# Patient Record
Sex: FEMALE | Race: WHITE | NOT HISPANIC OR LATINO | Employment: FULL TIME | URBAN - METROPOLITAN AREA
[De-identification: names, ages, dates, MRNs, and addresses within clinical notes are randomized per-mention and may not be internally consistent; named-entity substitution may affect disease eponyms.]

---

## 2017-02-01 ENCOUNTER — OFFICE VISIT (OUTPATIENT)
Dept: PSYCHOLOGY | Facility: CLINIC | Age: 42
End: 2017-02-01

## 2017-02-01 DIAGNOSIS — F41.1 GENERALIZED ANXIETY DISORDER: Primary | ICD-10-CM
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
## 2017-09-03 ENCOUNTER — HEALTH MAINTENANCE LETTER (OUTPATIENT)
Age: 42
End: 2017-09-03

## 2019-06-04 ENCOUNTER — HOSPITAL ENCOUNTER (OUTPATIENT)
Dept: CT IMAGING | Facility: HOSPITAL | Age: 44
Discharge: HOME | End: 2019-06-04

## 2019-06-04 ENCOUNTER — RECORDS - HEALTHEAST (OUTPATIENT)
Dept: ADMINISTRATIVE | Facility: OTHER | Age: 44
End: 2019-06-04

## 2019-06-04 ENCOUNTER — OFFICE VISIT (OUTPATIENT)
Dept: FAMILY MEDICINE | Facility: CLINIC | Age: 44
End: 2019-06-04
Payer: COMMERCIAL

## 2019-06-04 ENCOUNTER — COMMUNICATION - HEALTHEAST (OUTPATIENT)
Dept: TELEHEALTH | Facility: CLINIC | Age: 44
End: 2019-06-04

## 2019-06-04 VITALS
DIASTOLIC BLOOD PRESSURE: 68 MMHG | HEIGHT: 63 IN | OXYGEN SATURATION: 100 % | WEIGHT: 106.2 LBS | RESPIRATION RATE: 16 BRPM | BODY MASS INDEX: 18.82 KG/M2 | HEART RATE: 70 BPM | TEMPERATURE: 97.6 F | SYSTOLIC BLOOD PRESSURE: 118 MMHG

## 2019-06-04 DIAGNOSIS — J35.1 TONSILLAR HYPERTROPHY: ICD-10-CM

## 2019-06-04 DIAGNOSIS — K13.79 DEVIATION OF UVULA TO LEFT: Primary | ICD-10-CM

## 2019-06-04 DIAGNOSIS — K13.79 DEVIATION OF UVULA TO LEFT: ICD-10-CM

## 2019-06-04 DIAGNOSIS — J02.9 SORETHROAT: ICD-10-CM

## 2019-06-04 ASSESSMENT — PATIENT HEALTH QUESTIONNAIRE - PHQ9: SUM OF ALL RESPONSES TO PHQ QUESTIONS 1-9: 0

## 2019-06-04 ASSESSMENT — MIFFLIN-ST. JEOR: SCORE: 1103.85

## 2019-06-04 NOTE — LETTER
RETURN TO WORK/SCHOOL FORM    6/4/2019    Re: Kamilla Duarte  1975      To Whom It May Concern:     Kamilla Duarte was seen in clinic today..  She may return to work without restrictions on 6/5/19. Patient also has another appointment with specialist this afternoon. Thank you       Restrictions:        Massimo Vega MD  6/4/2019 9:34 AM

## 2019-06-04 NOTE — PROGRESS NOTES
Preceptor Attestation:   Patient seen, evaluated and discussed with the resident. I have verified the content of the note, which accurately reflects my assessment of the patient and the plan of care.  Supervising Physician:Leonela Reyna MD  [facility]

## 2019-06-04 NOTE — PATIENT INSTRUCTIONS
CT SCAN APPT: TODAY 6/4/19 AT 12:20 PM AT [facility] RADIOLOGY. CHECK IN AT 12:05 PM. NOTHING TO EAT OR DRINK 2 HOURS PRIOR TO APPT.   Referral Faxed

## 2019-06-04 NOTE — PROGRESS NOTES
HPI:       Kamilla Duarte is a 44 year old  female with a significant past medical history of restless leg syndrome, depression and anxiety who presents for the new concern(s) of    Sore throat  - Started about 1 week ago and has gotten better today  - This morning, notice more soreness on right side, describing it as a deep sensation  - Lost her voice, but improving today  - Swallow causes pain but no trouble with breathing  - Had cold; rhinorrhea, coughing,  - Sick contact with co-worker having similar symptoms  - No fevers, lightheadedness, dizziness, or significant weight changes  - Has been using cough drops and Teraflu with some relief         PMHX:     Patient Active Problem List   Diagnosis     Health Care Home     Restless legs syndrome (RLS)     Ovarian cyst     Generalized anxiety disorder     Carpal tunnel syndrome     Depression     Underweight       Current Outpatient Medications   Medication Sig Dispense Refill     PARoxetine (PAXIL) 20 MG tablet Take 1 tablet (20 mg) by mouth At Bedtime (Patient not taking: Reported on 6/4/2019) 30 tablet 1     PARoxetine (PAXIL) 40 MG tablet Take 1 tablet (40 mg) by mouth At Bedtime (Patient not taking: Reported on 6/4/2019) 30 tablet 11       Social History     Socioeconomic History     Marital status: Single     Spouse name: Not on file     Number of children: Not on file     Years of education: Not on file     Highest education level: Not on file   Occupational History     Not on file   Social Needs     Financial resource strain: Not on file     Food insecurity:     Worry: Not on file     Inability: Not on file     Transportation needs:     Medical: Not on file     Non-medical: Not on file   Tobacco Use     Smoking status: Never Smoker     Smokeless tobacco: Never Used   Substance and Sexual Activity     Alcohol use: No     Alcohol/week: 0.0 oz     Drug use: No     Sexual activity: Not on file   Lifestyle     Physical activity:     Days per week: Not on file      Minutes per session: Not on file     Stress: Not on file   Relationships     Social connections:     Talks on phone: Not on file     Gets together: Not on file     Attends Christianity service: Not on file     Active member of club or organization: Not on file     Attends meetings of clubs or organizations: Not on file     Relationship status: Not on file     Intimate partner violence:     Fear of current or ex partner: Not on file     Emotionally abused: Not on file     Physically abused: Not on file     Forced sexual activity: Not on file   Other Topics Concern     Parent/sibling w/ CABG, MI or angioplasty before 65F 55M? Not Asked   Social History Narrative    Older son: living with biologic father and not patient (poor behavior driving move)    David - 6 year old boy (concerns for ADHD)    Addy - 3-4 year old girl taking part in [employer]    J Luis: partner and father of Brandon     Family History   Problem Relation Age of Onset     Diabetes No family hx of      Coronary Artery Disease No family hx of      Hypertension No family hx of      Breast Cancer No family hx of      Colon Cancer No family hx of      Prostate Cancer No family hx of      Other Cancer No family hx of      Osteoporosis No family hx of      Allergies   Allergen Reactions     Nkda [No Known Drug Allergies]        No results found for this or any previous visit (from the past 24 hour(s)).         Review of Systems:   C: NEGATIVE for fatigue, unexpected change in weight  E: NEGATIVE for acute vision problems or changes  ENT/MOUTH: See HPI  RESP: See HPI  CV: NEGATIVE for chest pain, palpitations or new or worsening peripheral edema  GI: NEGATIVE for new onset or worsening nausea, vomiting or diarrhea  : NEGATIVE for frequency, dysuria, or hematuria  M: NEGATIVE for claudication, myalgias  N: NEGATIVE for weakness, dizziness, syncope, headaches  P: NEGATIVE for changes in mood or affect          Physical Exam:     Vitals:    06/04/19 0859    Resp: 16     There is no height or weight on file to calculate BMI.    GENERAL APPEARANCE: healthy, alert and no distress,  EYES: Eyes grossly normal to inspection,  PERRL  HENT: ear canals and TM's normal, right tonsil bed slightly enlarged with mild erythema with uvula deviated to the left  NECK: no adenopathy, no asymmetry, masses, or scars and thyroid normal to palpation  RESP: lungs clear to auscultation - no rales, rhonchi or wheezes  CV: regular rate and rhythm,  and no murmur, click,  rub or gallop  MS: extremities normal- no gross deformities noted  NEURO: Grossly normal  PSYCH: mood and affect normal/bright    Assessment and Plan     1. Deviation of uvula to left  2. Sorethroat  Concerned for peritonsillar abscess given uvula deviation and sore throat. Less likely strep throat without fever and with mild coughing. Possibility related to recent viral URI given history of sick contact and symptoms. Discuss with patient concerns and plans for evaluation with neck imaging which patient was agree able to.  - CT Neck today w/ contrast  - No antibiotics at this time  - Follow up with imaging, if signs of peritonsillar abscess will need evaluation with ENT  - If not, treat supportive and reevaluation in 1 week if not improving    Options for treatment and follow-up care were reviewed with the patient and/or guardian. Kamilla Duarte and/or guardian engaged in the decision making process and verbalized understanding of the options discussed and agreed with the final plan.    Massimo Vega MD  [facility]  Pager: [phone]    Precepted today with: Leonela Reyna MD

## 2019-06-05 ENCOUNTER — TELEPHONE (OUTPATIENT)
Dept: FAMILY MEDICINE | Facility: CLINIC | Age: 44
End: 2019-06-05

## 2019-06-05 NOTE — TELEPHONE ENCOUNTER
Called patient to discuss results.     She states she is now having some difficulty breathing and unable to drink water.     Given that this is more change from notes yesterday and this AM with breathing concerns recommend she go to the ED for evaluation. I did not feel comfortable prescribing her Abx or steroids without evaluation.     Patient plans to go to [facility]. Will call ED and inform of referral.     Prateek Moses MD   [facility]  Pager #: [phone]

## 2019-06-05 NOTE — TELEPHONE ENCOUNTER
Report available via HE and is as follows:  Significant Findings:  Indeterminate 11.7x 11.6mm focus of enhancement in the right palatine tonsil. Both infection and neoplasm are in the differential. This lesion demonstrates relative hypoenhancement centrally.  2.No lymphadenopathy    Will route to Dr.Meyers Leonela, for review and plan for patient r/t symptomatic.  Veronica ROBERT

## 2019-06-05 NOTE — TELEPHONE ENCOUNTER
Called [facility] r/t no report yet. More answered the phone and stated report needs to be corrected. Advised has to be routed to covering physician and will not be able to after 5pm. More stated will have it done before then to ensure a physician can be notified in time. Veronica ROBERT

## 2019-06-05 NOTE — TELEPHONE ENCOUNTER
Patient and her boyfriend walked into clinic to obtain results from imaging performed yesterday at [facility]. Results are not yet available in chart or  Epic system. C/o sore throat has worsened, not able to swallow well. Offered an afternoon appt for patient in hopes by then results would be available and with worsening symptoms, physician could re-evaluated throat. Pt declined, will await for results and further recommendation for treatment. In the meantime, recommend she can take adult dose of Children's ibuprofen liquid suspension to help with throat soreness.     Would like to request for work note to also be excused from work today as well. Yesterday received work note and was to return back to work today, 6/5/2019. Due to throat soreness worsening, not able to go back to work. To be excused off work, will have to route to Dr Vega for further intervention and action. Thank you. Yuliya ROBERT

## 2019-06-05 NOTE — TELEPHONE ENCOUNTER
Significant other states that patient can't really talk and is in pain. States you can call his number to let them know what they should do. Did notify him that the doctor might not address things if he can't get a verbal from the patient that it is okay to speak to him about her.

## 2019-06-05 NOTE — TELEPHONE ENCOUNTER
Discussed with Pamela regarding abnormal CT result. Radiologist wanted to ensure  reads radiology report based on abnormal findings. Noted focus enhancement on CT indicating infection or neoplasm. Will await report and route to house officer as  is not in clinic until tomorrow afternoon and patient is symptomatic. Veronica ROBERT

## 2019-06-06 ENCOUNTER — TELEPHONE (OUTPATIENT)
Dept: FAMILY MEDICINE | Facility: CLINIC | Age: 44
End: 2019-06-06

## 2019-06-07 ENCOUNTER — OFFICE VISIT (OUTPATIENT)
Dept: FAMILY MEDICINE | Facility: CLINIC | Age: 44
End: 2019-06-07
Payer: COMMERCIAL

## 2019-06-07 VITALS
DIASTOLIC BLOOD PRESSURE: 80 MMHG | RESPIRATION RATE: 14 BRPM | BODY MASS INDEX: 18.07 KG/M2 | WEIGHT: 102 LBS | SYSTOLIC BLOOD PRESSURE: 134 MMHG | HEART RATE: 82 BPM | HEIGHT: 63 IN | TEMPERATURE: 98.3 F | OXYGEN SATURATION: 99 %

## 2019-06-07 DIAGNOSIS — R09.A2 GLOBUS SENSATION: ICD-10-CM

## 2019-06-07 DIAGNOSIS — J36 PERITONSILLAR ABSCESS: Primary | ICD-10-CM

## 2019-06-07 RX ORDER — IBUPROFEN 100 MG/5ML
SUSPENSION, ORAL (FINAL DOSE FORM) ORAL
Refills: 0 | COMMUNITY
Start: 2019-06-05 | End: 2019-07-26

## 2019-06-07 RX ORDER — OXYCODONE HYDROCHLORIDE 5 MG/1
TABLET ORAL
Refills: 0 | COMMUNITY
Start: 2019-06-05 | End: 2019-07-26

## 2019-06-07 RX ORDER — AMOXICILLIN AND CLAVULANATE POTASSIUM 400; 57 MG/5ML; MG/5ML
875 POWDER, FOR SUSPENSION ORAL
COMMUNITY
Start: 2019-06-05 | End: 2019-07-11

## 2019-06-07 ASSESSMENT — MIFFLIN-ST. JEOR: SCORE: 1081.8

## 2019-06-07 NOTE — PATIENT INSTRUCTIONS
- Follow up in clinic with Dr. Salazar    [address] [place] [address], [id number]  [place] [address]

## 2019-06-07 NOTE — PROGRESS NOTES
HPI:       Kamilla Duarte is a 44  year old  female with a significant past medical history of restless leg syndrome, depression and anxiety who presents for    Peritonsillar Abscess  -Was seen in clinic approximately 3 days ago for sore throat and enlarged tonsil on exam  -Was sent for an ultrasound and presented to [facility] emergency department 1 day later for worsening throat pain  -CT neck shows 1 x 1 cm right peritonsillar mass concerning for infectious versus neoplasm process  ENT was consulted in the emergency department and recommended Augmentin with close follow-up in clinic  -Was scheduled for ENT clinic in the next week but symptoms are starting to get worse  -At today's clinic visit, she noted a globus feeling in the back of her throat describing it as a dry ball, no difficulty breathing or swallowing but has been hesitant due to pain  -Denied any fever, chills, lightheadedness, dizziness  -Has been compliant with liquid ibuprofen, antiacid and antibiotics  -Does not feel like she is going to be up to make it until clinic         PMHX:     Patient Active Problem List   Diagnosis     Health Care Home     Restless legs syndrome (RLS)     Ovarian cyst     Generalized anxiety disorder     Carpal tunnel syndrome     Depression     Underweight     Current Outpatient Medications   Medication Sig Dispense Refill     amoxicillin-clavulanate (AUGMENTIN) 400-57 MG/5ML suspension Take 875 mg by mouth       ibuprofen (ADVIL/MOTRIN) 100 MG/5ML suspension TK 20ML PO Q 6 H PRN P OR FEVER  0     oxyCODONE (ROXICODONE) 5 MG tablet   0     Social History     Socioeconomic History     Marital status: Single     Spouse name: Not on file     Number of children: Not on file     Years of education: Not on file     Highest education level: Not on file   Occupational History     Not on file   Social Needs     Financial resource strain: Not on file     Food insecurity:     Worry: Not on file     Inability: Not on file      Transportation needs:     Medical: Not on file     Non-medical: Not on file   Tobacco Use     Smoking status: Never Smoker     Smokeless tobacco: Never Used   Substance and Sexual Activity     Alcohol use: No     Alcohol/week: 0.0 oz     Drug use: No     Sexual activity: Not on file   Lifestyle     Physical activity:     Days per week: Not on file     Minutes per session: Not on file     Stress: Not on file   Relationships     Social connections:     Talks on phone: Not on file     Gets together: Not on file     Attends Buddhist service: Not on file     Active member of club or organization: Not on file     Attends meetings of clubs or organizations: Not on file     Relationship status: Not on file     Intimate partner violence:     Fear of current or ex partner: Not on file     Emotionally abused: Not on file     Physically abused: Not on file     Forced sexual activity: Not on file   Other Topics Concern     Parent/sibling w/ CABG, MI or angioplasty before 65F 55M? Not Asked   Social History Narrative    Older son: living with biologic father and not patient (poor behavior driving move)    David - 6 year old boy (concerns for ADHD)    Addy - 3-4 year old girl taking part in [employer]    J Luis: partner and father of Brandon       Family History   Problem Relation Age of Onset     Diabetes No family hx of      Coronary Artery Disease No family hx of      Hypertension No family hx of      Breast Cancer No family hx of      Colon Cancer No family hx of      Prostate Cancer No family hx of      Other Cancer No family hx of      Osteoporosis No family hx of           Allergies   Allergen Reactions     Nkda [No Known Drug Allergies]        No results found for this or any previous visit (from the past 24 hour(s)).         Review of Systems:   C: NEGATIVE for fatigue, unexpected change in weight  E: NEGATIVE for acute vision problems or changes  ENT/MOUTH: See HPI  R: NEGATIVE for significant cough or shortness of  "breath  CV: NEGATIVE for chest pain, palpitations or new or worsening peripheral edema  GI: NEGATIVE for new onset or worsening nausea, vomiting or diarrhea  M: NEGATIVE for claudication, myalgias  N: NEGATIVE for weakness, dizziness, syncope, headaches  P: NEGATIVE for changes in mood or affect          Physical Exam:     Vitals:    06/07/19 1452   BP: 134/80   Pulse: 82   Resp: 14   Temp: 98.3  F (36.8  C)   TempSrc: Oral   SpO2: 99%   Weight: 46.3 kg (102 lb)   Height: 1.6 m (5' 3\")     Body mass index is 18.07 kg/m .    GENERAL APPEARANCE: healthy, alert and no distress,  HENT: Right tonsillar bed enlargement with no exudate, deviated uvula to the left, unable to open mouth beyond 1 inch, pain is localized to the preauricular region  NECK: no adenopathy, no asymmetry, masses, or scars and thyroid normal to palpation  RESP: lungs clear to auscultation - no rales, rhonchi or wheezes  CV: regular rate and rhythm,  and no murmur, click,  rub or gallop  NEURO: Grossly nromal  PSYCH: anxious and worried      Assessment and Plan     1. Peritonsillar abscess  2. Globus Sensation  Recently underwent CT neck showing 1 x 1 cm mass concerning for infectious versus neoplasm.  Has been started on Augmentin for the last 2 days which she has been compliant with.  Has been taking ibuprofen with no significant relief.  Has a upcoming appointment with ENT but will be seen until 1 week.  Unable to eat due to pain and experiencing globus feeling.  -Discussed imaging finding with patient  -Continue with antibiotics and liquid ibuprofen  -Emergent ENT evaluation, will proceed straight to [facility] for possible I&D    Options for treatment and follow-up care were reviewed with the patient and/or guardian. Kamilla Duarte and/or guardian engaged in the decision making process and verbalized understanding of the options discussed and agreed with the final plan.    Massimo Vega MD  [facility]  Pager: [phone]    Precepted today with: Dirk Dillard MD

## 2019-06-10 ENCOUNTER — TELEPHONE (OUTPATIENT)
Dept: FAMILY MEDICINE | Facility: CLINIC | Age: 44
End: 2019-06-10

## 2019-06-11 ENCOUNTER — TELEPHONE (OUTPATIENT)
Dept: FAMILY MEDICINE | Facility: CLINIC | Age: 44
End: 2019-06-11

## 2019-06-11 NOTE — TELEPHONE ENCOUNTER
Date of discharge: 06/09/2019  Facility of discharge: [facility]  Patient concerns about condition: No concerns at this time.  Patient concerns about medications: No concerns at this time.  Full med reconciliation will be completed at clinic visit.  Patient concerns about transitioning: No concerns at this time.  Clinic office visit appointment date: 06/13/2019  Patient reminded to bring all medications (prescription and over-the-counter) to clinic appointment: Yes    Using the date of discharge as day 1, the 30th day post discharge is 07/09/2019.

## 2019-06-11 NOTE — PROGRESS NOTES
I have personally reviewed the history and examination as documented by Dr. Vega.  I was present during key portions of the visit and agree with the assessment and plan as documented for 44 yr old female with peritonsillar abscess here for follow-up. Symptoms worsening. Acute appt made w/ ENT for drainage in clinic. Precautions given. Anticipatory guidance given.     Dirk Dillard MD  June 11, 2019  2:40 PM

## 2019-06-13 ENCOUNTER — OFFICE VISIT (OUTPATIENT)
Dept: FAMILY MEDICINE | Facility: CLINIC | Age: 44
End: 2019-06-13
Payer: COMMERCIAL

## 2019-06-13 VITALS
SYSTOLIC BLOOD PRESSURE: 113 MMHG | DIASTOLIC BLOOD PRESSURE: 77 MMHG | HEART RATE: 79 BPM | WEIGHT: 101.6 LBS | HEIGHT: 63 IN | BODY MASS INDEX: 18 KG/M2 | RESPIRATION RATE: 18 BRPM | TEMPERATURE: 97.3 F | OXYGEN SATURATION: 99 %

## 2019-06-13 DIAGNOSIS — Z09 HOSPITAL DISCHARGE FOLLOW-UP: Primary | ICD-10-CM

## 2019-06-13 PROBLEM — J36 PERITONSILLAR ABSCESS: Status: ACTIVE | Noted: 2019-06-08

## 2019-06-13 ASSESSMENT — MIFFLIN-ST. JEOR: SCORE: 1079.98

## 2019-06-13 NOTE — LETTER
RETURN TO WORK/SCHOOL FORM    6/13/2019    Re: Kamilla Duarte  1975      To Whom It May Concern:     Kamilla Duarte was seen in clinic today..  She may return to work without restrictions on 6/17/19          Restrictions:  None      Aron Bean MD  6/13/2019 10:55 AM

## 2019-06-13 NOTE — PROGRESS NOTES
I have personally reviewed the history and examination as documented by Dr. Bean.  I was present during key portions of the visit and agree with the assessment and plan as documented for 44 yr old female with recent hospitalization for peritonsillar abscess here for follow-up.  Stable/ improved. Precautions given. Anticipatory guidance given.     Dirk Dillard MD  June 13, 2019  11:31 AM

## 2019-06-13 NOTE — PATIENT INSTRUCTIONS
Continue to eat food as tolerated.  Cut up foods into small bite-sized pieces.  You can drink supplement shakes (ex. Boost) up to three times per day.    Try not to over exert yourself.  If you feel tired with an activity, you can stop it.  However, try to stay active as tolerated.    You should continue to feel more energy over the next several day.    Please follow up with ENT at your scheduled visit.  We will see you again in 6-8 weeks

## 2019-06-13 NOTE — PROGRESS NOTES
"  Hospitalization Follow-up Visit         [facility]       Hospital Follow-up Visit:    Hospital:  [facility]   Date of Admission: 6/7/19  Date of Discharge: 6/9/19  Reason(s) for Admission: Peritonsillar abscess            Problems taking medications regularly:  None       Post Discharge Medication Reconciliation: discharge medications reconciled, continue medications without change.       Problems adhering to non-medication therapy:  None       Medications reviewed by: by myself.    Summary of hospitalization:  [facility] discharge summary reviewed  Diagnostic Tests/Treatments reviewed.  Follow up needed: none  Other Healthcare Providers Involved in Patient s Care: ENT visit this 6/14/19  Update since discharge: improved.  Plan of care communicated with patient      Interval Hx: meals consist of noodles, soft foods, cut up vegetables.  Doesn't feel swollen up in her throat.  Still taking amoxicillin and no issues with it.  Her ability to open her mouth is 50% normal now.  ROS: (+)Still muffled, fatigued, weight loss.  (-)dizziness, sore throat, drooling, cough, trouble breathing, fever/chills, headache, trouble turning head, neck pain, chest pain, sob, nausea, vomiting         Review of Systems:   12-point ROS negative except as noted in HPI            Physical Exam:     Vitals:    06/13/19 1019   BP: 113/77   Pulse: 79   Resp: 18   Temp: 97.3  F (36.3  C)   TempSrc: Oral   SpO2: 99%   Weight: 46.1 kg (101 lb 9.6 oz)   Height: 1.6 m (5' 3\")     Body mass index is 18 kg/m .    GENERAL: healthy, alert and no distress  EYES: PERRL, conjunctivae and sclerae normal and lids and lashes normal  HENT: ear canals and TM's normal, nose and mouth without ulcers or lesions, posterior pharynx edematous, no tonsillar edema or asymmetry, uvula midline  NECK: thyroid normal to palpation, trachea midline and normal to palpation and scattered cervical adenopathy 1-cm  RESP: lungs clear to auscultation - no rales, no rhonchi, no " wheezes  CV: regular rates and rhythm, normal S1 S2, no S3 or S4 and no murmur, no click or rub  ABDOMEN: soft, no tenderness, no  hepatosplenomegaly, no masses, normal bowel sounds  MS: extremities- no gross deformities noted, no edema  SKIN: no suspicious lesions, no rashes  NEURO: Normal strength and tone, sensory exam grossly normal  BACK: no CVA tenderness, no paralumbar tenderness  PSYCH: Alert and oriented times 3; speech- coherent , normal rate and volume; able to articulate logical thoughts, able to abstract reason, no tangential thoughts, no hallucinations or delusions, affect- normal         Results:     Results from last visit   Results for orders placed or performed in visit on 11/27/15   Chlamydia/Gono Amplified ([facility])   Result Value Ref Range    Chlamydia trac,Amplified Prb Negative Negative    N gonorrhoeae,Amplified Prb Negative Negative    Narrative    Test performed by:  [facility] [address] [place] [address]       Assessment and Plan      Kamilla was seen today for hospital f/u and medication reconciliation.    Diagnoses and all orders for this visit:    Hospital discharge follow-up    4-days post-hospital discharge for peritonsillar abscess.  Patient received IV clindamycin and IV dexamethasone.  She quickly improved with these treatments.  She was switched to oral Augmentin.  Since discharge, she continues to improve symptomatically.  She still deals with fatigue and poor appetite.  -Provided time-off work note  -Provided advice in regards to activity, diet, and expectation for energy level recovery  -Suggested three supplement shakes per day on top of meals  -F/u 6-8 weeks     E&M code to be billed if TCM cannot be: 93584    Type of decision making: Moderate complexity (00246)    Options for treatment and follow-up care were reviewed with the patient  Kamillahéctor Duarte   engaged in the decision making process and verbalized understanding of the options discussed and  agreed with the final plan.      Aron Bean MD

## 2019-06-14 ENCOUNTER — OFFICE VISIT - HEALTHEAST (OUTPATIENT)
Dept: OTOLARYNGOLOGY | Facility: CLINIC | Age: 44
End: 2019-06-14

## 2019-06-14 ENCOUNTER — TRANSFERRED RECORDS (OUTPATIENT)
Dept: HEALTH INFORMATION MANAGEMENT | Facility: CLINIC | Age: 44
End: 2019-06-14

## 2019-06-14 DIAGNOSIS — J36 PERITONSILLAR ABSCESS: ICD-10-CM

## 2019-07-10 ENCOUNTER — TELEPHONE (OUTPATIENT)
Dept: FAMILY MEDICINE | Facility: CLINIC | Age: 44
End: 2019-07-10

## 2019-07-11 ENCOUNTER — OFFICE VISIT (OUTPATIENT)
Dept: FAMILY MEDICINE | Facility: CLINIC | Age: 44
End: 2019-07-11
Payer: COMMERCIAL

## 2019-07-11 VITALS
WEIGHT: 102 LBS | OXYGEN SATURATION: 98 % | SYSTOLIC BLOOD PRESSURE: 158 MMHG | BODY MASS INDEX: 18.07 KG/M2 | RESPIRATION RATE: 20 BRPM | HEIGHT: 63 IN | HEART RATE: 72 BPM | DIASTOLIC BLOOD PRESSURE: 81 MMHG | TEMPERATURE: 97.6 F

## 2019-07-11 DIAGNOSIS — N91.2 AMENORRHEA: Primary | ICD-10-CM

## 2019-07-11 LAB
FSH: 28.2 MIU/ML
PROLACTIN SERPL-MCNC: 6.5 NG/ML (ref 0–20)
TSH SERPL DL<=0.05 MIU/L-ACNC: 1.21 UIU/ML (ref 0.3–5)

## 2019-07-11 ASSESSMENT — MIFFLIN-ST. JEOR: SCORE: 1081.8

## 2019-07-11 NOTE — LETTER
July 17, 2019      Kamilla Duarte  [address] [place] [address]        Dear Kamilla,    Please see below for your test results.    Thank you for visiting our clinic on Jul 11, 2019.     The result of your recent labwork has returned and is as follows:     Your TSH and prolactin hormone levels were normal.   Your FSH level was slightly elevated and your estradiol level was low which can be seen in several conditions that can cause your periods to stop.     Dr. Vega will be able to further review these lab results and discuss next steps at your appointment on Friday July 26 at 1:20 PM.     If you have any questions or concerns, please feel free to give us a call.     Resulted Orders   Prolactin ([facility])   Result Value Ref Range    Prolactin 6.5 0.0 - 20.0 ng/mL    Narrative    Test performed by:  [facility] [address] [place] [address]   TSH  Sensitive ([facility])   Result Value Ref Range    TSH 1.21 0.30 - 5.00 uIU/mL    Narrative    Test performed by:  [facility] [address] [place] [address]   FSH ([facility])   Result Value Ref Range    FSH 28.2 mIU/mL      Comment:         Females:     Prepubertal     0-10 mIU/mL    Follicular      3-20 mIU/mL    Luteal          0-12 mIU/mL    Ovulatory       9-26 mIU/mL    Postmenopausal   mIU/mL      Narrative    Test performed by:  [facility] [address] [place] [address]   Estradiol ([facility])   Result Value Ref Range    Estradiol 10 pg/mL    Narrative    Test performed by:  [facility] [address] [place] [address]  Males:  Prepubertal.................<12 pg/mL  Adult........................10-60 pg/mL  Females:  Prepubertal.................<8 pg/mL  Early Follicular............ pg/mL  Late Follicular.............100-400 pg/mL  Luteal...................... pg/mL  Postmenopausal..............<18 pg/mL       If you have any questions, please call the clinic to make an  appointment.    Sincerely,    Gretchen Us MD

## 2019-07-11 NOTE — PROGRESS NOTES
"       HPI:       Kamilla Duarte is a 44 year old female who presents for the new concern of:    1. Amenorrhea:   Has been at least 3 months, maybe 4 months since her last period. Previously had 30 day cycles. Took several home pregnancy tests which were negative, went to the ED on 7/9/19 where serum pregnancy test was negative. Did have approximately 1 hour of vaginal spotting at that time. She is very concerned about pregnancy and wants an ultrasound today \"just to be sure.\" States that her best friend had a negative pregnancy test and ended up delivering a baby 6 months ago and so patient is concerned that all of her previous tests may have been false negatives.    She has had 3 prior pregnancies that resulted in live births (children ages 8, 18, and 22). She is sexually active with her boyfriend and they do not use any type of birth control. She states that she has had to increase her pant size over the past month, however chart review shows no weight change. No breast tenderness, morning sickness, or other signs of pregnancy.    She is not sure when her mother or other female family members went through menopause. No hot flashes or night sweats. No history of endocrine disorders, denies hair or nail changes, temperature intolerance, or anxiety.     Wt Readings from Last 5 Encounters:   07/11/19 46.3 kg (102 lb)   06/13/19 46.1 kg (101 lb 9.6 oz)   06/07/19 46.3 kg (102 lb)   06/04/19 48.2 kg (106 lb 3.2 oz)   11/27/15 42.5 kg (93 lb 12.8 oz)            PMHX:     Patient Active Problem List   Diagnosis     Health Care Home     Restless legs syndrome (RLS)     Ovarian cyst     Generalized anxiety disorder     Carpal tunnel syndrome     Depression     Underweight     Peritonsillar abscess     Current Outpatient Medications   Medication Sig Dispense Refill     ibuprofen (ADVIL/MOTRIN) 100 MG/5ML suspension TK 20ML PO Q 6 H PRN P OR FEVER  0     oxyCODONE (ROXICODONE) 5 MG tablet   0     Social History     Social " "History Narrative    Lives with her partner, J Luis, and their daughter Addy who is 8. She has 18 and 22 year old sons. Denies tobacco, alcohol, marijuana, or illicit drug use.        Allergies   Allergen Reactions     Nkda [No Known Drug Allergies]      Results for orders placed or performed in visit on 07/11/19 (from the past 24 hour(s))   Prolactin ([facility])   Result Value Ref Range    Prolactin 6.5 0.0 - 20.0 ng/mL    Narrative    Test performed by:  [facility] [address] [place] [address]   TSH  Sensitive ([facility])   Result Value Ref Range    TSH 1.21 0.30 - 5.00 uIU/mL    Narrative    Test performed by:  [facility] [address] [place] [address]   FSH ([facility])   Result Value Ref Range    FSH 28.2 mIU/mL    Narrative    Test performed by:  [facility] [address] [place] [address]          Review of Systems:     10 point ROS neg other than the symptoms noted above in the HPI.          Physical Exam:     Vitals:    07/11/19 1620 07/11/19 1630   BP: 145/81 158/81   Pulse: 72    Resp: 20    Temp: 97.6  F (36.4  C)    SpO2: 98%    Weight: 46.3 kg (102 lb)    Height: 1.6 m (5' 3\")      Body mass index is 18.07 kg/m .    GENERAL APPEARANCE: healthy, alert and no distress,  EYES: Eyes grossly normal to inspection,  PERRL  NECK: no adenopathy, no asymmetry, masses, or scars and thyroid normal to palpation  RESP: lungs clear to auscultation - no rales, rhonchi or wheezes  CV: regular rate and rhythm,  and no murmur, click,  rub or gallop  ABDOMEN: soft, nontender, without hepatosplenomegaly or masses  MS: extremities normal- no gross deformities noted  SKIN: no suspicious lesions or rashes, no evidence of hirsuitism on exam  NEURO: Normal strength and tone, sensory exam grossly normal, mentation appears intact and speech normal      Assessment and Plan     1. Amenorrhea  Patient with 3-4 months of amenorrhea. Pregnancy has been ruled out with multiple urine and " blood tests, no evidence of uterine enlargement on physical exam today, so I do not believe and ultrasound is warranted at this time. Possible etiologies for her secondary amenorrhea include premature menopause, hypothalamic dysfunction, pituitary dysfunction. No evidence of hirsuitism on exam that would be concerning for hyperandrogenism as the cause of her amenorrhea. Patient's TSH and prolactin levels were within normal limits. Her FSH level was normal - will add on E2 level to evaluate for hypogonadotropic hypogonadism. Should that be normal, would consider performing a progesterone withdrawal test.   - Prolactin ([facility])  - TSH  Sensitive ([facility])  - FSH ([facility])  - Estradiol ([facility])    Options for treatment and follow-up care were reviewed with the patient and/or guardian. Kamilla Duarte and/or guardian engaged in the decision making process and verbalized understanding of the options discussed and agreed with the final plan.      Gretchen Us MD  [facility] Resident  P: [phone]    Precepted today with: Airam Xiao MD

## 2019-07-11 NOTE — PATIENT INSTRUCTIONS
The blood test that they did at the ED on 7/9/19 was negative for pregnancy. Let's check some hormone levels to evaluate for other causes of irregular periods.

## 2019-07-12 LAB — ESTRADIOL SERPL-MCNC: 10 PG/ML

## 2019-07-15 NOTE — PROGRESS NOTES
Preceptor Attestation:  Patient's case reviewed and discussed with Gretchen Us MD resident and I evaluated the patient. I agree with written assessment and plan of care.  Supervising Physician:  MARCI HEMPHILL MD  [facility]

## 2019-07-22 ENCOUNTER — TELEPHONE (OUTPATIENT)
Dept: FAMILY MEDICINE | Facility: CLINIC | Age: 44
End: 2019-07-22

## 2019-07-22 NOTE — TELEPHONE ENCOUNTER
"Called patient and advised of annotated results. Patient stated \"does that mean I am in menopause\". Advised patient not necessarily r/t FSH is typically a level that is consistently elevated in menopause, stated this could mean further evaluation or possible diagnosis. Advised to come to upcoming appointment and results and next steps can be discussed with the provider. Patient stated she has been very emotional lately and had a breakdown at work. Reinforced importance of coming to upcoming appointment, provided day and time for patient. Patient verbalized understanding. Veronica ROBERT  "

## 2019-07-22 NOTE — TELEPHONE ENCOUNTER
[facility] phone call message- patient requesting results:    Test: Lab    Date of test: 07/11/2019    Additional Comments: Patient states she was in clinic and had some testing done. Patient has not heard back and would like to follow up on her results, Please call and advise.    OK to leave a message on voice mail? Yes    Primary language: English      needed? No    Call taken on July 22, 2019 at 9:51 AM by Eder Danielle

## 2019-07-26 ENCOUNTER — OFFICE VISIT (OUTPATIENT)
Dept: FAMILY MEDICINE | Facility: CLINIC | Age: 44
End: 2019-07-26
Payer: COMMERCIAL

## 2019-07-26 VITALS
DIASTOLIC BLOOD PRESSURE: 80 MMHG | WEIGHT: 102 LBS | BODY MASS INDEX: 18.07 KG/M2 | HEIGHT: 63 IN | SYSTOLIC BLOOD PRESSURE: 134 MMHG | RESPIRATION RATE: 14 BRPM | TEMPERATURE: 98 F | OXYGEN SATURATION: 100 % | HEART RATE: 65 BPM

## 2019-07-26 DIAGNOSIS — N91.2 AMENORRHEA: Primary | ICD-10-CM

## 2019-07-26 DIAGNOSIS — F43.9 STRESS: ICD-10-CM

## 2019-07-26 ASSESSMENT — MIFFLIN-ST. JEOR: SCORE: 1081.8

## 2019-07-26 NOTE — PROGRESS NOTES
Preceptor Attestation:   Patient seen, evaluated and discussed with the resident. I have verified the content of the note, which accurately reflects my assessment of the patient and the plan of care.    Supervising Physician:Antione Tijerina MD    [facility]

## 2019-07-26 NOTE — PROGRESS NOTES
HPI:       Kamilla Duarte is a 44 year old  female with a significant past medical history of anxiety, depression, restless leg syndrome who presents for follow up of concern(s) listed below    Amenorrhea  - LMP was about 3 months ago and unclear if she had been regular prior to that  - Unclear when she first had her period  - Does not know when her family enter menopause   - Has been pregnant 3 times and is concerned that she may pregnant again despite knowing she had negative UPT  - Recent blood work with FSH, Estradiol, TSH and Prolactin. Was told that her TSH and Prolactin was normal but her Estradiol was lower and FSH slightly high  - No urinary or bowel movement changes, lightheadedness or dizziness, chest pain, shortness of breath, vision, hearing changes, increase in hair growth or recent weight changes  - Has notice more stress at work    Stress  - Has been more stressful at work with co-worker worrying about her  - Concerned about her son and how the county is taking care of him  - Concerned about daughter when at a Protest and was concerned about guns  - Denied any manic symptoms including grandiosity, ability to function without sleep or prolong periods of not sleeping, impulsive behavior such as spending spree  - No homicidal or suicidal ideations         PMHX:     Patient Active Problem List   Diagnosis     Health Care Home     Restless legs syndrome (RLS)     Ovarian cyst     Generalized anxiety disorder     Carpal tunnel syndrome     Depression     Underweight     Peritonsillar abscess     No current outpatient medications on file.       Social History     Socioeconomic History     Marital status: Single     Spouse name: Not on file     Number of children: Not on file     Years of education: Not on file     Highest education level: Not on file   Occupational History     Not on file   Social Needs     Financial resource strain: Not on file     Food insecurity:     Worry: Not on file      Inability: Not on file     Transportation needs:     Medical: Not on file     Non-medical: Not on file   Tobacco Use     Smoking status: Never Smoker     Smokeless tobacco: Never Used   Substance and Sexual Activity     Alcohol use: No     Alcohol/week: 0.0 oz     Drug use: No     Sexual activity: Not on file   Lifestyle     Physical activity:     Days per week: Not on file     Minutes per session: Not on file     Stress: Not on file   Relationships     Social connections:     Talks on phone: Not on file     Gets together: Not on file     Attends Che[employer] service: Not on file     Active member of club or organization: Not on file     Attends meetings of clubs or organizations: Not on file     Relationship status: Not on file     Intimate partner violence:     Fear of current or ex partner: Not on file     Emotionally abused: Not on file     Physically abused: Not on file     Forced sexual activity: Not on file   Other Topics Concern     Parent/sibling w/ CABG, MI or angioplasty before 65F 55M? Not Asked   Social History Narrative    Lives with her partner, J Luis, and their daughter Addy who is 8. She has 18 and 22 year old sons. Denies tobacco, alcohol, marijuana, or illicit drug use.        Family History   Problem Relation Age of Onset     Diabetes No family hx of      Coronary Artery Disease No family hx of      Hypertension No family hx of      Breast Cancer No family hx of      Colon Cancer No family hx of      Prostate Cancer No family hx of      Other Cancer No family hx of      Osteoporosis No family hx of           Allergies   Allergen Reactions     Nkda [No Known Drug Allergies]        No results found for this or any previous visit (from the past 24 hour(s)).         Review of Systems:   C: NEGATIVE for fatigue, unexpected change in weight  E: NEGATIVE for acute vision problems or changes  R: NEGATIVE for significant cough or shortness of breath  CV: NEGATIVE for chest pain, palpitations or new or  "worsening peripheral edema  GI: NEGATIVE for new onset or worsening nausea, vomiting or diarrhea  : NEGATIVE for frequency, dysuria, or hematuria  M: NEGATIVE for claudication, myalgias  N: NEGATIVE for weakness, dizziness, syncope, headaches  E: NEGATIVE for temperature intolerance, skin/hair changes  H: NEGATIVE for easy bruising or bleeding problems  P: NEGATIVE for changes in mood or affect          Physical Exam:     Vitals:    07/26/19 1314   BP: 134/80   Pulse: 65   Resp: 14   Temp: 98  F (36.7  C)   TempSrc: Oral   SpO2: 100%   Weight: 46.3 kg (102 lb)   Height: 1.6 m (5' 3\")     Body mass index is 18.07 kg/m .    GENERAL APPEARANCE: healthy, alert and no distress,  EYES: Eyes grossly normal to inspection,  PERRL  NECK: no adenopathy, no asymmetry, masses, or scars and thyroid normal to palpation  RESP: lungs clear to auscultation - no rales, rhonchi or wheezes  CV: regular rate and rhythm,  and no murmur, click,  rub or gallop  ABDOMEN: soft, nontender, without hepatosplenomegaly or masses  MS: extremities normal- no gross deformities noted  NEURO: Grossly normal  PSYCH: mood and affect normal/bright    Assessment and Plan     1. Amenorrhea  Previously testing (normal TSH, Proloactin, Low Estrogen and high FSH) indicated primary ovarian failure. Discuss likely etiology with patient and plans to repeat FSH and Estrogen in 1 month for confirmation. Discuss plans to monitor and possible estrogen therapy for symptoms related to ovarian failure. UPT was negative making pregnancy unlikely. No hirsutism or other symptoms to indicate PCOS.   - Follow up in 2.5 weeks for repeat FSH testing (consider LH)  - Continue discussion about primary ovarian failure  - Discuss warning signs to follow up in clinic    2. Stress  Recent life stressors including issues with son and daughter. Work has been stressful with questioning her recent health and complications from peritonsillar abscess. No SI or HI. Possible underlying " anxiety vs personality disorder. No signs of bryan to indicate bipolar disorder.  - Contracted for safety  - Follow up in 2-3 weeks for recheck  - Declined any desire for medications at this time    Options for treatment and follow-up care were reviewed with the patient and/or guardian. Kamilla Duarte and/or guardian engaged in the decision making process and verbalized understanding of the options discussed and agreed with the final plan.    Massimo Vega MD  [facility]  Pager: [phone]    Precepted today with: Antione Tijerina MD

## 2019-07-26 NOTE — LETTER
RETURN TO WORK/SCHOOL FORM    7/26/2019    Re: Kamilla Duarte  1975      To Whom It May Concern:     Kamilla Duarte was seen in clinic today.  She may return to work without restrictions on 7/27/19.       Restrictions:  Nonefull duty      Massimo Vega MD  7/26/2019 1:57 PM

## 2019-08-12 ENCOUNTER — OFFICE VISIT (OUTPATIENT)
Dept: FAMILY MEDICINE | Facility: CLINIC | Age: 44
End: 2019-08-12
Payer: COMMERCIAL

## 2019-08-12 VITALS
HEART RATE: 78 BPM | WEIGHT: 103 LBS | SYSTOLIC BLOOD PRESSURE: 109 MMHG | BODY MASS INDEX: 18.25 KG/M2 | RESPIRATION RATE: 20 BRPM | DIASTOLIC BLOOD PRESSURE: 72 MMHG | HEIGHT: 63 IN | TEMPERATURE: 97.9 F

## 2019-08-12 DIAGNOSIS — N95.1 PERIMENOPAUSE: Primary | ICD-10-CM

## 2019-08-12 DIAGNOSIS — F43.0 ACUTE REACTION TO STRESS: ICD-10-CM

## 2019-08-12 PROBLEM — J36 PERITONSILLAR ABSCESS: Status: RESOLVED | Noted: 2019-06-08 | Resolved: 2019-08-12

## 2019-08-12 LAB
FSH: 13.6 MIU/ML
HCG UR QL: NEGATIVE
LH, SERUM: 28.2 MIU/ML

## 2019-08-12 RX ORDER — HYDROXYZINE HYDROCHLORIDE 25 MG/1
25 TABLET, FILM COATED ORAL EVERY 8 HOURS PRN
Qty: 30 TABLET | Refills: 1 | Status: SHIPPED | OUTPATIENT
Start: 2019-08-12 | End: 2020-04-29

## 2019-08-12 ASSESSMENT — PATIENT HEALTH QUESTIONNAIRE - PHQ9
SUM OF ALL RESPONSES TO PHQ QUESTIONS 1-9: 2
5. POOR APPETITE OR OVEREATING: MORE THAN HALF THE DAYS

## 2019-08-12 ASSESSMENT — ANXIETY QUESTIONNAIRES
6. BECOMING EASILY ANNOYED OR IRRITABLE: NOT AT ALL
2. NOT BEING ABLE TO STOP OR CONTROL WORRYING: NOT AT ALL
7. FEELING AFRAID AS IF SOMETHING AWFUL MIGHT HAPPEN: NOT AT ALL
3. WORRYING TOO MUCH ABOUT DIFFERENT THINGS: NOT AT ALL
1. FEELING NERVOUS, ANXIOUS, OR ON EDGE: NOT AT ALL

## 2019-08-12 ASSESSMENT — MIFFLIN-ST. JEOR: SCORE: 1092.45

## 2019-08-12 NOTE — PATIENT INSTRUCTIONS
Patient Education     Breathing Techniques: Diaphragmatic Breathing  Diaphragmatic breathing or belly breathing helps you to breathe with your diaphragm. The diaphragm is a large muscle that plays an important part in breathing. It's located below your lungs. It separates your chest from your abdomen.  With a chronic lung disease, you may use your accessory muscles (a combination of muscles in your chest, shoulders, and neck) instead of your diaphragm. Using these muscles takes more effort and makes shortness of breath worse. Using your diaphragm makes breathing easier and allows you to take in more air.  Diaphragmatic breathing may help you:    Be able to breathe easier    Take in more air    Relax    Exercise or be more active  How to do diaphragmatic breathing      1. Lie on your back with a pillow under your head or sit in a comfortable chair. Make sure your back is supported.  2. Place one hand on your chest and one hand on your abdomen, the area over your stomach.  3. Breathe in slowly through your nose. Count to 2. As you inhale, your abdomen should move out against your hand. Your chest should stay still. .  4. Breathe out with your lips together. Count to 4. As you breathe out, you should feel your stomach move in.  5. Notice that you breathe in to a count of 2 and that you breathe out to a count of 4. This helps you to keep your breathing slow and steady.  6. Practice this breathing technique for 5 to 10 minutes at first. Try to do it 3 to 4 times a day. Then increase the length of time and how often you practice it.  Date Last Reviewed: 2/1/2017 2000-2018 [employer]. [address] [place] [id number]. All rights reserved. This information is not intended as a substitute for professional medical care. Always follow your healthcare professional's instructions.           Patient Education     Perimenopause  Menopause is when you stop having periods for good. For many women, this  happens around age 50. The time of change before this is called perimenopause. It may start in your late 30s or 40s. It can last for months or years. During this time, your body makes lower levels of female hormones. This causes certain changes in your body. You may already have begun to feel the effects of these changes. By taking steps to relieve symptoms, you can still feel good.    The menstrual cycle  Hormones are chemicals that have specific jobs in the body. A menstrual cycle is caused by changes in the levels of 2 female hormones. These hormones are estrogen and progesterone. They are made by the ovaries. In a normal cycle, estrogen creates a lining in the uterus to allow for pregnancy. The ovary then releases an egg. This is called ovulation. Progesterone levels start to go up. If the egg is not fertilized, progesterone levels go down. This causes the lining in the uterus to be shed. This is the bleeding that is your period.  Changes in hormones  As a woman ages, her ovaries begin to make hormones less regularly. In some months, there may not be ovulation. Without ovulation, progesterone isn't secreted so a normal period doesn't occur. The menstrual cycle will be harder to predict. Over time, the ovaries stop working. This can cause symptoms. Some women who have had their uterus taken out (hysterectomy) but still have ovaries may still have symptoms. When estrogen levels reach their lowest point, periods will stop completely. This is menopause.  Symptoms of perimenopause  The change in hormones can cause physical symptoms. It can also cause emotional symptoms. These may include:    Periods that come more or less often    Periods that are lighter or heavier than you re used to    Hot flashes, night sweats, or trouble sleeping    Vaginal dryness, which may make sex painful    Mood swings or fatigue    Palpitations    Sleep disturbances    Urinary symptoms including frequency and incontinence  Medicines that  may help  Some medicines can help ease the effects of perimenopause. These include:    Low-dose birth control pills. These often contain both estrogen and progesterone. They can help regulate your periods.    Hormone therapy (HT). This replaces some of the hormones your body has stopped making.    Antidepressants. These help balance brain chemicals that may decrease during this time. Signs of depression can include often feeling sad or hopeless. If you feel this way, be sure to talk to your healthcare provider.    Gabapentin. This is a medicine also used to treat seizures. This controls hot flashes and night sweats in some women.    Clonidine. This medicine may control hot flashes and night sweats.  Date Last Reviewed: 11/1/2017 2000-2018 [employer]. [address] [place] [address], [place] [address]. All rights reserved. This information is not intended as a substitute for professional medical care. Always follow your healthcare professional's instructions.

## 2019-08-12 NOTE — PROGRESS NOTES
I have personally reviewed the history and examination as documented by Dr. Bean.  I was present during key portions of the visit and agree with the assessment and plan as documented for 44 yr old female here for meena-menopausal symptoms and adjustment disorder. Trial of hydroxyzine. Precautions given. Anticipatory guidance given.     Dirk Dillard MD  August 12, 2019  9:40 AM

## 2019-08-14 NOTE — RESULT ENCOUNTER NOTE
"Please call with results. Please let patient known,     \"Your labs came back.  Interestingly, your FSH went down and your LH went up (previously your FSH was high).  These types of fluctuations in FSH are typical of perimenopause.  However, It is hard to say at this point if you will enter menopause within the year.  We will continue to watch your menstrual patterns.  Let us know if you develop new symptoms, like hot flashes, insomnia, vaginal dryness, poor libido.\"    Aron Bean MD"

## 2019-08-14 NOTE — RESULT ENCOUNTER NOTE
Called and spoke to pt regarding results and recommendation. Pt states understanding and have no further concern for MD at this time. --FLETCHERer, CMA

## 2019-08-14 NOTE — PROGRESS NOTES
HPI:       Kamilla Duarte is a 44 year old  female with a significant past medical history of stress reaction and secondary amenorrhea who presents for follow up of concern(s) listed below    1) Secondary amenorrhea  -No period since beginning of April 2019  -Single 1-hour episode of vaginal spotting on 7/9/19  -Prior cycles were regular  -Multiple negative B-HCG tests since then  -Sexually active with boyfriend, do not use birth control  -Symptoms: Increased tiredness, cold intolerability.  (-) hot flashes, night sweats, mood swings, sadness/depression, vaginal dryness, poor libido    2) Stress reaction  - (employer being more strict with customer/transaction times)  -She then has to come home and clean her house/help with child  -Felt like she had more energy [place]  - (+) Anxious, poor energy.  (-) depression, anhedonia, poor concentration, slowing down           PMHX:     Patient Active Problem List   Diagnosis     Health Care Home     Restless legs syndrome (RLS)     Ovarian cyst     Generalized anxiety disorder     Carpal tunnel syndrome     Depression     Underweight     Peritonsillar abscess       Past Surgical History:   Procedure Laterality Date     NO HISTORY OF SURGERY         No current outpatient medications on file.          Allergies   Allergen Reactions     Nkda [No Known Drug Allergies]        Social History     Socioeconomic History     Marital status: Single     Spouse name: Not on file     Number of children: Not on file     Years of education: Not on file     Highest education level: Not on file   Occupational History     Not on file   Social Needs     Financial resource strain: Not on file     Food insecurity:     Worry: Not on file     Inability: Not on file     Transportation needs:     Medical: Not on file     Non-medical: Not on file   Tobacco Use     Smoking status: Never Smoker     Smokeless tobacco: Never Used   Substance and Sexual Activity      "Alcohol use: No     Alcohol/week: 0.0 oz     Drug use: No     Sexual activity: Not on file   Lifestyle     Physical activity:     Days per week: Not on file     Minutes per session: Not on file     Stress: Not on file   Relationships     Social connections:     Talks on phone: Not on file     Gets together: Not on file     Attends Islam service: Not on file     Active member of club or organization: Not on file     Attends meetings of clubs or organizations: Not on file     Relationship status: Not on file     Intimate partner violence:     Fear of current or ex partner: Not on file     Emotionally abused: Not on file     Physically abused: Not on file     Forced sexual activity: Not on file   Other Topics Concern     Parent/sibling w/ CABG, MI or angioplasty before 65F 55M? Not Asked   Social History Narrative    Lives with her partner, J Luis, and their daughter Addy who is 8. She has 18 and 22 year old sons. Denies tobacco, alcohol, marijuana, or illicit drug use.        No results found for this or any previous visit (from the past 24 hour(s)).         Review of Systems:   7-point ROS reviewed and negative unless otherwise noted in HPI          Physical Exam:     Vitals:    08/12/19 0842   BP: 109/72   Pulse: 78   Resp: 20   Temp: 97.9  F (36.6  C)   TempSrc: Oral   Weight: 46.7 kg (103 lb)   Height: 1.61 m (5' 3.39\")     Body mass index is 18.02 kg/m .    GENERAL APPEARANCE: healthy, alert and no distress,  EYES: Eyes grossly normal to inspection, PERRL and conjunctivae and sclerae normal  HENT: nose and mouth without ulcers or lesions, oral mucous membranes moist and oropharynx clear  NECK: no adenopathy, no asymmetry, masses, or scars and thyroid normal to palpation  RESP: lungs clear to auscultation - no rales, rhonchi or wheezes  CV: regular rate and rhythm,  and no murmur, click,  rub or gallop  SKIN: no suspicious lesions or rashes  PSYCH: Anxious, rapid speech with slight slurring      Assessment " and Plan     (N95.1) Perimenopause  (primary encounter diagnosis)  Comment: 4-month of amenorrhea.  VS WNL.  Exam as above.  Prior labs show mild-moderate elevation of FSH, estradiol is low, and TSH and prolactin were normal.  Most likely is perimenopause.  However, DDx could include stress, borderline underweight.  Will see in 2 months to see if periods started again.  Discussed course of perimenopause and menopause.  Discussed treatment with estrogen for bone, heart,  protection once in menopause.  Plan:  -Repeat FSH, LH, and UPT  -Prescribed hydroxyzine 25mg TID PRN anxiety or insomnia    (F43.0) Acute reaction to stress  Comment: Psychosocial stress from work and home responsibilities.  Improvement in symptoms when spending time with hobbies.  Encouraged patient to balance hobbies with work and home. Patient does yoga occasionally, so encouraged patient to continue this.  Plan:   -Gave information on deep breathing techniques  -Prescribed hydroxyzine 25mg TID PRN anxiety or insomnia      Options for treatment and follow-up care were reviewed with the patient and/or guardian. Kamilla Duarte and/or guardian engaged in the decision making process and verbalized understanding of the options discussed and agreed with the final plan.    Aron Bean MD      Precepted today with: Dirk Dillard MD

## 2019-10-01 ENCOUNTER — TELEPHONE (OUTPATIENT)
Dept: FAMILY MEDICINE | Facility: CLINIC | Age: 44
End: 2019-10-01

## 2019-10-01 NOTE — TELEPHONE ENCOUNTER
----- Message from Aron Bean MD sent at 9/30/2019  9:28 PM CDT -----  Regarding: Please schedule patient  Can you guys call this patient to see if we can schedule her for a pap smear?    Thanks!  -Dr. Bean

## 2019-10-01 NOTE — TELEPHONE ENCOUNTER
Attempted call but no answer. Left VM for pt to return call to schedule pap smear with PCP. --Don, CMA

## 2019-10-02 NOTE — TELEPHONE ENCOUNTER
Patient does not want a Pap at this moment. State she will call back once she figures her work schedule.

## 2019-10-07 ENCOUNTER — TELEPHONE (OUTPATIENT)
Dept: FAMILY MEDICINE | Facility: CLINIC | Age: 44
End: 2019-10-07

## 2020-04-29 ENCOUNTER — VIRTUAL VISIT (OUTPATIENT)
Dept: FAMILY MEDICINE | Facility: CLINIC | Age: 45
End: 2020-04-29

## 2020-04-29 ENCOUNTER — TELEPHONE (OUTPATIENT)
Dept: FAMILY MEDICINE | Facility: CLINIC | Age: 45
End: 2020-04-29

## 2020-04-29 VITALS — WEIGHT: 100 LBS | HEIGHT: 62 IN | BODY MASS INDEX: 18.4 KG/M2

## 2020-04-29 DIAGNOSIS — F41.0 PANIC ATTACK: Primary | ICD-10-CM

## 2020-04-29 DIAGNOSIS — F41.1 GENERALIZED ANXIETY DISORDER: ICD-10-CM

## 2020-04-29 RX ORDER — DULOXETIN HYDROCHLORIDE 30 MG/1
CAPSULE, DELAYED RELEASE ORAL
Qty: 60 CAPSULE | Refills: 0 | Status: SHIPPED | OUTPATIENT
Start: 2020-04-29

## 2020-04-29 ASSESSMENT — ANXIETY QUESTIONNAIRES
IF YOU CHECKED OFF ANY PROBLEMS ON THIS QUESTIONNAIRE, HOW DIFFICULT HAVE THESE PROBLEMS MADE IT FOR YOU TO DO YOUR WORK, TAKE CARE OF THINGS AT HOME, OR GET ALONG WITH OTHER PEOPLE: SOMEWHAT DIFFICULT
GAD7 TOTAL SCORE: 2
1. FEELING NERVOUS, ANXIOUS, OR ON EDGE: NOT AT ALL
5. BEING SO RESTLESS THAT IT IS HARD TO SIT STILL: NOT AT ALL
3. WORRYING TOO MUCH ABOUT DIFFERENT THINGS: NOT AT ALL
2. NOT BEING ABLE TO STOP OR CONTROL WORRYING: SEVERAL DAYS
7. FEELING AFRAID AS IF SOMETHING AWFUL MIGHT HAPPEN: NOT AT ALL
6. BECOMING EASILY ANNOYED OR IRRITABLE: NOT AT ALL

## 2020-04-29 ASSESSMENT — PATIENT HEALTH QUESTIONNAIRE - PHQ9: 5. POOR APPETITE OR OVEREATING: SEVERAL DAYS

## 2020-04-29 ASSESSMENT — MIFFLIN-ST. JEOR: SCORE: 1056.85

## 2020-04-29 NOTE — PROGRESS NOTES
Preceptor Attestation:  I discussed the patient with the resident. I have verified the content of the note, which accurately reflects my assessment of the patient and the plan of care.  Supervising Physician:Jordy Mendoza MD  [facility]

## 2020-04-29 NOTE — TELEPHONE ENCOUNTER
Would like to discuss anxiousness when required and use a facial mask while at work. Recommend an appt with physician for further discussion. RLee RN

## 2020-04-29 NOTE — TELEPHONE ENCOUNTER
[facility] Family Medicine phone call message-patient reporting a symptom:     Symptom: Shortness of Breath, Heart Beating fast, tingling arms    Same Day Visit Offered: no    Additional comments: Patient called stating she has been having shortness of breath, fast beating heart and tingling in arms. She states she doesn't know what to do and has trouble using a mask or scark at work. Notified JAKOB Curran and callw as warm transferred.     OK to leave message on voice mail? Yes    Primary language: English      needed? No    Call taken on April 29, 2020 at 2:09 PM by Jackie Gross

## 2020-04-29 NOTE — PROGRESS NOTES
"Family Medicine Telephone Visit Note         Telephone Visit Consent   Patient was verbally read the following and verbal consent was obtained.    \"This telephone visit will be conducted via a call between you and your physician/provider. We have found that certain health care needs can be provided without the need for a physical exam.  This service lets us provide the care you need with a short phone conversation.  If a prescription is necessary we can send it directly to your pharmacy.  If lab work is needed we can place an order for that and you can then stop by our lab to have the test done at a later time.    Telephone visits are billed at different rates depending on your insurance coverage. During this emergency period, for some insurers they may be billed the same as an in-person visit.  Please reach out to your insurance provider with any questions.    If during the course of the call the physician/provider feels a telephone visit is not appropriate, you will not be charged for this service.\"    Name person giving consent:  Patient   Date verbal consent given:  4/29/2020  Time verbal consent given:  2:48 PM           Chief Complaint   Patient presents with     Anxiety     Feeling anxious while wearing the face mask at work. Not sure if its panic attack or what, can't breath with mask on. Just wanted discuss with provider for suggestions     Medication Reconciliation     Needs attention             HPI   Patients name: Kamilla  Appointment start time:  3:05 PM    1) anxious  -Story: patient works as a  and company policy is to wear masks.  She will randomly start to have symptoms while she is working and wearing the mask.  It is a surgical type mask.  It is quick onset.  She first senses difficulty breathing, then followed by feeling hot, sweaty, heart racing.  She then can't think or do anything else and has to leave the area.    -ONLY happens with mask on  -Not improved by different mask type  -Better " "with taking off mask  -Not affected by exertion, lying down  -Somewhat similar symptoms in the past  -Patient has HARLEEN  -No past heart or lung problems or blood clots  -active at work and home, no recent surgery or bed bound  -Not a smoker  -She is not taking any meds right now  -She denies fever, chills, headaches, loss of smell, sinus congestion, sore throat, body aches, cough, chest pain, nausea, vomiting, diarrhea, rash      Panic Attack  PD = >1 attack, followed by >1 month of persistent worry about next attack or maladaptive behaviors     # of attacks: unsure, multiple  Sx peak within 10 minutes: yes  Sx include (>4): flushed, sweaty, SOB, palpitations, fear of death    Phobias    1) [place]  Marked fear/anxiety about (>2): No to public transportation, open spaces, enclosed spaces, in a crowd, outside home  Above situations almost always trigger sx: no  Behaviors to avoid situations: no    2) Specific  Marked fear/anxiety about specific situation/object: no  Above situations almost always trigger sx: no  Behaviors to avoid situations: no        Current Outpatient Medications   Medication Sig Dispense Refill     hydrOXYzine (ATARAX) 25 MG tablet Take 1 tablet (25 mg) by mouth every 8 hours as needed for anxiety (insomnia) 30 tablet 1     Allergies   Allergen Reactions     Nkda [No Known Drug Allergies]               Review of Systems:     10-point ROS reviewed and negative unless otherwise noted in HPI         Physical Exam:     There were no vitals taken for this visit.  Estimated body mass index is 18.02 kg/m  as calculated from the following:    Height as of 8/12/19: 1.61 m (5' 3.39\").    Weight as of 8/12/19: 46.7 kg (103 lb).    Exam:  Constitutional: healthy, alert and mild distress  Resp: able to speak full sentences, no cough or wheeze  Psychiatric: mentation appears normal. speech is pressured and rapid, but somewhat quiet and trails off. Affect is anxious.  No SI/HI.  No hallucinations or delusions       "  Assessment and Plan   1. Panic attack  2. Generalized anxiety disorder  44-year-old female with PMHx of HARLEEN that presents for panic attack type symptoms with mask use at work.  VS limited by telephone visit.  Exam shows no respiratory distress, but clear anxiety state over phone.  DDx includes HARLEEN with panic attacks, new anxiety disorder, hyperthyroidism, substance use.  Unlikely ACS, PTX, or PE considering exam (although limited), history, risk elements, and low risk on [place] PE (even without knowing HR).  Discussed behavioral options to work out with employer, willing to provide letter.  Discussed nature of likely anxiety disorder.  Will start maintenance medication.  Tried multiple SSRIs in the past, but did not like them.  Will try SNRI.  - DULoxetine (CYMBALTA) 30 MG capsule; Take 1 capsule daily for 7 days.  Then, take 2 capsules daily after that.  Dispense: 60 capsule; Refill: 0  -RTC in 2-4 weeks    Refilled medications that would be required in the next 3 months.     After Visit Information:  Will print and mail AVS     No follow-ups on file.    Appointment end time: 3:24 PM  This is a telephone visit that took 19 minutes.      Clinician location:  [facility]/[place]    Aron Bean MD  I precepted today with Dr. Mendoza.

## 2020-04-30 ASSESSMENT — ANXIETY QUESTIONNAIRES: GAD7 TOTAL SCORE: 2

## 2020-05-04 ENCOUNTER — TELEPHONE (OUTPATIENT)
Dept: FAMILY MEDICINE | Facility: CLINIC | Age: 45
End: 2020-05-04

## 2020-05-05 NOTE — TELEPHONE ENCOUNTER
Called pt, who said she was doing much after a few days of rest since her 5/1 ED visit. Pt has had no concerns with medication. Declined EDF, but said she would call if concerns arose.

## 2020-05-15 ENCOUNTER — TELEPHONE (OUTPATIENT)
Dept: FAMILY MEDICINE | Facility: CLINIC | Age: 45
End: 2020-05-15

## 2020-05-15 NOTE — LETTER
5/15/2020      RE: Kamilla Duarte  [address] [place] [address]       To Whom It May Concern:    Kamilla Duarte is a patient of mine.  She deals with a medical issue that interferes with her having a mask on her face for prolonged periods of time.  Please allow her to wear a plastic sheild that covers her entire face in substitution of her face mask.    If you have any questions, please give us a call    Aron Bean MD

## 2020-05-15 NOTE — TELEPHONE ENCOUNTER
[facility] Family Medicine phone call message- general phone call:    Reason for call: Patient states that she gets panic attack and the medication she was given is not working. She received medication from the ER which she did not take as she felt it is not needed yet. Patient would like for a letter to her work to explain she is claustrophobic and she has spoken to a provider before. Patient is requesting to be able to wear a shield but her work states a letter is needed. Please fax to [employer] at [phone].    Return call needed: Yes    OK to leave a message on voice mail? Yes    Primary language: English      needed? No    Call taken on May 15, 2020 at 11:51 AM by Eder Danielle

## 2020-05-15 NOTE — LETTER
5/15/2020      RE: Kamilla Duarte  [address] [place] [address]       To Whom It May Concern:    Kamilla Duarte is a patient of mine.  She requires to wear a full face mask during work in place of a surgical or cloth mask due to a medical issue.    If you have any questions, please call our clinic    Aron Bean MD

## 2021-05-29 NOTE — PROGRESS NOTES
HPI: This patient is a 45 yo who presents for evaluation of the tonsils at the request of Dr. Vega. Patient seen by Dr. Salazar on 6/7/19 for PTA and unable to drain in clinic as patient was unable to open her mouth wide enough.  Rx'd cefdinir and prednisone, but patient worsened and presented to [facility] ED on 6/8/19. Patient admitted for IV antibiotics and steroids per recommendations of Dr. Navarrete, ENT who also assessed patient and patient unable to open mouth wide enough for I&D so was continued on IV abx and steroids with improvement.  Patient presents today for follow-up and states she is much improved.  No pain and able to eat and drink normally now. Also able to open mouth fully. Denies fever, odonyphagia, dysphagia, shortness of breath, or other symptoms. Still on Amoxicillin. States she's finished the liquid pain meds.    Past medical history, surgical history, social history, family history, medications, and allergies have been reviewed with the patient and are documented above.    Review of Systems: a 10-system review was performed. Pertinent positives are noted in the HPI and on a separate scanned document in the chart.    PHYSICAL EXAMINATION:  GEN: no acute distress, normocephalic  EYES: extraocular movements are intact, pupils are equal and round. Sclera clear.   EARS: auricles are normally formed.   NOSE: anterior nares are patent.   OC/OP: clear, dentition is in good repair. Patient able to open mouth fully. The tongue and palate are fully mobile and symmetric. No evidence of abscess, exudate, or tonsillar hypertrophy. Trace edema of right tonsil that does not extend to the soft palate.  NECK: soft and supple. No lymphadenopathy or masses. Airway is midline.  NEURO: CN VII and XII are intact bilaterally. alert and oriented. No nystagmus. Gait is normal.  PULM: breathing comfortably on room air, normal chest expansion with respiration  HEART: no clubbing or cyanosis, no peripheral  edema    MEDICAL DECISION-MAKING: Kamilla is a 45 yo with peritonsillar abscess that has resolved.  Reassured patient there was no evidence of infection or abscess today. Recommend finish course of antibiotics.  If symptoms return, recommend patient present to ED.  She is with good understanding and in agreement of plan.

## 2021-05-30 ENCOUNTER — RECORDS - HEALTHEAST (OUTPATIENT)
Dept: ADMINISTRATIVE | Facility: CLINIC | Age: 46
End: 2021-05-30

## 2021-06-02 ENCOUNTER — RECORDS - HEALTHEAST (OUTPATIENT)
Dept: ADMINISTRATIVE | Facility: CLINIC | Age: 46
End: 2021-06-02

## 2021-06-03 ENCOUNTER — RECORDS - HEALTHEAST (OUTPATIENT)
Dept: ADMINISTRATIVE | Facility: CLINIC | Age: 46
End: 2021-06-03

## 2021-07-31 ENCOUNTER — HEALTH MAINTENANCE LETTER (OUTPATIENT)
Age: 46
End: 2021-07-31

## 2021-09-25 ENCOUNTER — HEALTH MAINTENANCE LETTER (OUTPATIENT)
Age: 46
End: 2021-09-25

## 2022-08-27 ENCOUNTER — HEALTH MAINTENANCE LETTER (OUTPATIENT)
Age: 47
End: 2022-08-27

## 2022-12-26 ENCOUNTER — HEALTH MAINTENANCE LETTER (OUTPATIENT)
Age: 47
End: 2022-12-26

## 2023-09-17 ENCOUNTER — HEALTH MAINTENANCE LETTER (OUTPATIENT)
Age: 48
End: 2023-09-17